# Patient Record
Sex: MALE | Race: BLACK OR AFRICAN AMERICAN | NOT HISPANIC OR LATINO
[De-identification: names, ages, dates, MRNs, and addresses within clinical notes are randomized per-mention and may not be internally consistent; named-entity substitution may affect disease eponyms.]

---

## 2018-03-13 ENCOUNTER — TRANSCRIPTION ENCOUNTER (OUTPATIENT)
Age: 53
End: 2018-03-13

## 2018-03-14 PROBLEM — Z00.00 ENCOUNTER FOR PREVENTIVE HEALTH EXAMINATION: Status: ACTIVE | Noted: 2018-03-14

## 2019-10-21 ENCOUNTER — APPOINTMENT (OUTPATIENT)
Dept: ORTHOPEDIC SURGERY | Facility: CLINIC | Age: 54
End: 2019-10-21

## 2020-01-06 ENCOUNTER — INPATIENT (INPATIENT)
Facility: HOSPITAL | Age: 55
LOS: 0 days | Discharge: HOME | DRG: 312 | End: 2020-01-07
Attending: INTERNAL MEDICINE | Admitting: INTERNAL MEDICINE
Payer: COMMERCIAL

## 2020-01-06 ENCOUNTER — OUTPATIENT (OUTPATIENT)
Dept: OUTPATIENT SERVICES | Facility: HOSPITAL | Age: 55
LOS: 1 days | End: 2020-01-06
Payer: COMMERCIAL

## 2020-01-06 VITALS
DIASTOLIC BLOOD PRESSURE: 81 MMHG | HEART RATE: 58 BPM | SYSTOLIC BLOOD PRESSURE: 126 MMHG | RESPIRATION RATE: 17 BRPM | OXYGEN SATURATION: 98 % | TEMPERATURE: 98 F | HEIGHT: 71 IN | WEIGHT: 205.91 LBS

## 2020-01-06 DIAGNOSIS — G04.89 OTHER MYELITIS: ICD-10-CM

## 2020-01-06 DIAGNOSIS — R55 SYNCOPE AND COLLAPSE: ICD-10-CM

## 2020-01-06 LAB
ALBUMIN SERPL ELPH-MCNC: 3.9 G/DL — SIGNIFICANT CHANGE UP (ref 3.3–5)
ALP SERPL-CCNC: 21 U/L — LOW (ref 40–120)
ALT FLD-CCNC: 17 U/L — SIGNIFICANT CHANGE UP (ref 10–45)
ANION GAP SERPL CALC-SCNC: 11 MMOL/L — SIGNIFICANT CHANGE UP (ref 5–17)
APTT BLD: 24.5 SEC — LOW (ref 27.5–36.3)
AST SERPL-CCNC: 6 U/L — LOW (ref 10–40)
BASOPHILS # BLD AUTO: 0.02 K/UL — SIGNIFICANT CHANGE UP (ref 0–0.2)
BASOPHILS NFR BLD AUTO: 0.2 % — SIGNIFICANT CHANGE UP (ref 0–2)
BILIRUB SERPL-MCNC: 1.2 MG/DL — SIGNIFICANT CHANGE UP (ref 0.2–1.2)
BUN SERPL-MCNC: 16 MG/DL — SIGNIFICANT CHANGE UP (ref 7–23)
CALCIUM SERPL-MCNC: 8.5 MG/DL — SIGNIFICANT CHANGE UP (ref 8.4–10.5)
CHLORIDE SERPL-SCNC: 106 MMOL/L — SIGNIFICANT CHANGE UP (ref 96–108)
CO2 SERPL-SCNC: 22 MMOL/L — SIGNIFICANT CHANGE UP (ref 22–31)
CREAT SERPL-MCNC: 1.11 MG/DL — SIGNIFICANT CHANGE UP (ref 0.5–1.3)
EOSINOPHIL # BLD AUTO: 0.1 K/UL — SIGNIFICANT CHANGE UP (ref 0–0.5)
EOSINOPHIL NFR BLD AUTO: 1.2 % — SIGNIFICANT CHANGE UP (ref 0–6)
GLUCOSE SERPL-MCNC: 149 MG/DL — HIGH (ref 70–99)
HCT VFR BLD CALC: 45.2 % — SIGNIFICANT CHANGE UP (ref 39–50)
HGB BLD-MCNC: 15.6 G/DL — SIGNIFICANT CHANGE UP (ref 13–17)
IMM GRANULOCYTES NFR BLD AUTO: 0.3 % — SIGNIFICANT CHANGE UP (ref 0–1.5)
INR BLD: 1.41 RATIO — HIGH (ref 0.88–1.16)
LYMPHOCYTES # BLD AUTO: 4.01 K/UL — HIGH (ref 1–3.3)
LYMPHOCYTES # BLD AUTO: 46.3 % — HIGH (ref 13–44)
MAGNESIUM SERPL-MCNC: 2 MG/DL — SIGNIFICANT CHANGE UP (ref 1.6–2.6)
MCHC RBC-ENTMCNC: 29.2 PG — SIGNIFICANT CHANGE UP (ref 27–34)
MCHC RBC-ENTMCNC: 34.5 GM/DL — SIGNIFICANT CHANGE UP (ref 32–36)
MCV RBC AUTO: 84.6 FL — SIGNIFICANT CHANGE UP (ref 80–100)
MONOCYTES # BLD AUTO: 0.6 K/UL — SIGNIFICANT CHANGE UP (ref 0–0.9)
MONOCYTES NFR BLD AUTO: 6.9 % — SIGNIFICANT CHANGE UP (ref 2–14)
NEUTROPHILS # BLD AUTO: 3.91 K/UL — SIGNIFICANT CHANGE UP (ref 1.8–7.4)
NEUTROPHILS NFR BLD AUTO: 45.1 % — SIGNIFICANT CHANGE UP (ref 43–77)
NRBC # BLD: 0 /100 WBCS — SIGNIFICANT CHANGE UP (ref 0–0)
PHOSPHATE SERPL-MCNC: 3.2 MG/DL — SIGNIFICANT CHANGE UP (ref 2.5–4.5)
PLATELET # BLD AUTO: 126 K/UL — LOW (ref 150–400)
POTASSIUM SERPL-MCNC: 3.4 MMOL/L — LOW (ref 3.5–5.3)
POTASSIUM SERPL-SCNC: 3.4 MMOL/L — LOW (ref 3.5–5.3)
PROT SERPL-MCNC: 5.2 G/DL — LOW (ref 6–8.3)
PROTHROM AB SERPL-ACNC: 16.4 SEC — HIGH (ref 10–12.9)
RBC # BLD: 5.34 M/UL — SIGNIFICANT CHANGE UP (ref 4.2–5.8)
RBC # FLD: 13.9 % — SIGNIFICANT CHANGE UP (ref 10.3–14.5)
SODIUM SERPL-SCNC: 139 MMOL/L — SIGNIFICANT CHANGE UP (ref 135–145)
TROPONIN T, HIGH SENSITIVITY RESULT: 17 NG/L — SIGNIFICANT CHANGE UP (ref 0–51)
WBC # BLD: 8.67 K/UL — SIGNIFICANT CHANGE UP (ref 3.8–10.5)
WBC # FLD AUTO: 8.67 K/UL — SIGNIFICANT CHANGE UP (ref 3.8–10.5)

## 2020-01-06 PROCEDURE — 85730 THROMBOPLASTIN TIME PARTIAL: CPT

## 2020-01-06 PROCEDURE — 76937 US GUIDE VASCULAR ACCESS: CPT

## 2020-01-06 PROCEDURE — 71046 X-RAY EXAM CHEST 2 VIEWS: CPT | Mod: 26

## 2020-01-06 PROCEDURE — 76937 US GUIDE VASCULAR ACCESS: CPT | Mod: 26

## 2020-01-06 PROCEDURE — C1752: CPT

## 2020-01-06 PROCEDURE — 36514 APHERESIS PLASMA: CPT

## 2020-01-06 PROCEDURE — 36556 INSERT NON-TUNNEL CV CATH: CPT

## 2020-01-06 PROCEDURE — 99222 1ST HOSP IP/OBS MODERATE 55: CPT | Mod: 25

## 2020-01-06 PROCEDURE — C1769: CPT

## 2020-01-06 PROCEDURE — 93291 INTERROG DEV EVAL SCRMS IP: CPT | Mod: 26

## 2020-01-06 PROCEDURE — 93010 ELECTROCARDIOGRAM REPORT: CPT | Mod: NC

## 2020-01-06 PROCEDURE — 85384 FIBRINOGEN ACTIVITY: CPT

## 2020-01-06 PROCEDURE — 85027 COMPLETE CBC AUTOMATED: CPT

## 2020-01-06 PROCEDURE — 77001 FLUOROGUIDE FOR VEIN DEVICE: CPT | Mod: 26

## 2020-01-06 PROCEDURE — 77001 FLUOROGUIDE FOR VEIN DEVICE: CPT

## 2020-01-06 PROCEDURE — 82330 ASSAY OF CALCIUM: CPT

## 2020-01-06 PROCEDURE — 85610 PROTHROMBIN TIME: CPT

## 2020-01-06 PROCEDURE — 82962 GLUCOSE BLOOD TEST: CPT

## 2020-01-06 PROCEDURE — 99285 EMERGENCY DEPT VISIT HI MDM: CPT

## 2020-01-06 RX ORDER — INFLUENZA VIRUS VACCINE 15; 15; 15; 15 UG/.5ML; UG/.5ML; UG/.5ML; UG/.5ML
0.5 SUSPENSION INTRAMUSCULAR ONCE
Refills: 0 | Status: DISCONTINUED | OUTPATIENT
Start: 2020-01-06 | End: 2020-01-07

## 2020-01-06 RX ORDER — DOCUSATE SODIUM 100 MG
1 CAPSULE ORAL
Qty: 0 | Refills: 0 | DISCHARGE

## 2020-01-06 RX ORDER — GABAPENTIN 400 MG/1
1 CAPSULE ORAL
Qty: 0 | Refills: 0 | DISCHARGE

## 2020-01-06 RX ORDER — GABAPENTIN 400 MG/1
100 CAPSULE ORAL THREE TIMES A DAY
Refills: 0 | Status: DISCONTINUED | OUTPATIENT
Start: 2020-01-06 | End: 2020-01-07

## 2020-01-06 RX ORDER — SODIUM CHLORIDE 9 MG/ML
1000 INJECTION INTRAMUSCULAR; INTRAVENOUS; SUBCUTANEOUS
Refills: 0 | Status: DISCONTINUED | OUTPATIENT
Start: 2020-01-06 | End: 2020-01-07

## 2020-01-06 RX ORDER — CHOLECALCIFEROL (VITAMIN D3) 125 MCG
1 CAPSULE ORAL
Qty: 0 | Refills: 0 | DISCHARGE

## 2020-01-06 RX ORDER — POTASSIUM CHLORIDE 20 MEQ
20 PACKET (EA) ORAL ONCE
Refills: 0 | Status: COMPLETED | OUTPATIENT
Start: 2020-01-06 | End: 2020-01-06

## 2020-01-06 RX ORDER — ATENOLOL 25 MG/1
1 TABLET ORAL
Qty: 0 | Refills: 0 | DISCHARGE

## 2020-01-06 RX ORDER — ACETAMINOPHEN 500 MG
2 TABLET ORAL
Qty: 0 | Refills: 0 | DISCHARGE

## 2020-01-06 RX ORDER — ATENOLOL 25 MG/1
25 TABLET ORAL DAILY
Refills: 0 | Status: DISCONTINUED | OUTPATIENT
Start: 2020-01-06 | End: 2020-01-07

## 2020-01-06 RX ORDER — DIPHENHYDRAMINE HCL 50 MG
25 CAPSULE ORAL ONCE
Refills: 0 | Status: DISCONTINUED | OUTPATIENT
Start: 2020-01-06 | End: 2020-01-06

## 2020-01-06 RX ORDER — L.ACIDOPH/B.ANIMALIS/B.LONGUM 15B CELL
1 CAPSULE ORAL
Qty: 0 | Refills: 0 | DISCHARGE

## 2020-01-06 RX ADMIN — Medication 20 MILLIEQUIVALENT(S): at 19:13

## 2020-01-06 NOTE — ED PROVIDER NOTE - ATTENDING CONTRIBUTION TO CARE
54M, pmh htn, denies dx'd with transverse myelitis 10/19 (previously on steroids, gabapentin), with R subclavian central line placed by IR earlier today, presents s/p syncopal episode after first plasmapheresis session. Pt in apheresis unit, had full plasmapheresis session. Afterwards, pt had waxing and waning bp, was given IVF, PO fluids, food as had not yet eaten and was being observed. Pt was sitting upright, felt lightheaded, and lost consciousness for <1 min. Witnessed by wife, no shaking, no incontinence, no -post-ictal period. No tongue biting. Now feeling well, denies any cp, sob, abd pain. No recent illnesses, f/c, n/v/d, or other complaints. Prior to losing consciousness, deneis any other sx including cp, sob, palpitation, or other complaints.    PE: NAD, NCAT, MMM, Trachea midline, Normal conjunctiva, lungs CTAB, S1/S2 RRR, Normal perfusion, 2+ radial pulses bilat, Abdomen Soft, NTND, No rebound/guarding, No LE edema, No deformity of extremities, No rashes,  No focal motor or sensory deficits. CN II-XII intact. Visual fields intact. EOMI, PERRLA. Facial sensation equal bilat. Smile and eye closure equal bilat. Hearing intact bilat. Palate elevation equal, tongue protrusion midline. Lateral head rotation equal bilat. 5/5 strength UE and LE bilat. Decreased sensation bilat UE (unchanged since dx transverse myelitis).    Pt very well appearing. EKG with J point elevation precordial leads, though appropriate intervals. Obtain troponin, though very low suspicion acs. Check CBC eval for anemia, cmp eval for metabolic derangement. Monitor. Re-eval. - Rolando Gaming MD

## 2020-01-06 NOTE — H&P ADULT - NSICDXPASTMEDICALHX_GEN_ALL_CORE_FT
PAST MEDICAL HISTORY:  HOCM (hypertrophic obstructive cardiomyopathy)     HTN (hypertension)     Transverse myelitis

## 2020-01-06 NOTE — ED PROVIDER NOTE - NS ED ROS FT
ROS:  GENERAL: No fever, no chills  EYES: no change in vision  HEENT: no trouble swallowing, no trouble speaking  CARDIAC: no chest pain  PULMONARY: no cough, no dyspnea  GI: no abdominal pain, no nausea, no vomiting, no diarrhea, no constipation  : No dysuria, hematuria or frequency  SKIN: no rashes  NEURO: syncope, no headache, no weakness  MSK: No joint pain

## 2020-01-06 NOTE — ED PROVIDER NOTE - PHYSICAL EXAMINATION
Physical Exam:  Gen: NAD, AOx3, non-toxic appearing, able to ambulate without assistance  Head: NCAT  HEENT: EOMI, PEERLA, normal conjunctiva, tongue midline, oral mucosa moist  Lung: CTAB, no respiratory distress, no wheezes/rhonchi/rales B/L, speaking in full sentences  CV: RRR, no murmurs, rubs or gallops, distal pulses 2+ b/l  Abd: soft, NT, ND, no guarding, no rigidity, no rebound tenderness, no CVA tenderness   MSK: no visible deformities, ROM normal in UE/LE, no back TTP  Neuro: 5/5 strength RUE and RLE, 4/5 strength LUE and LLE (chronic), decreased sensation UE's b/l (chronic), normal FTN, CN II-XII intact  Skin: Warm, well perfused, no rash, no leg swelling  Psych: normal affect, calm

## 2020-01-06 NOTE — ED PROVIDER NOTE - OBJECTIVE STATEMENT
55yo male HTN, HOCM w/ loop recorder and transverse myelitis p/w syncope after first apheresis session today. Spoke with Herbie RODRIGUES from apheresis and said patient was normotensive and feeling episodically lightheaded when he ultimately syncopized while sitting. Did not witness seizure-like activity, tongue biting, incontinence or head trauma. Patient remembers feeling lightheaded then woke up in the stretcher on the way to the ED. Denies chest pain, dyspnea, new numbness/weakness, change in vision, headache.

## 2020-01-06 NOTE — ED PROVIDER NOTE - PROGRESS NOTE DETAILS
Spoke to Dr. Garza from transfusions medicine. Received total 700 ml NS. She states that pt does have HOCM and has a loop recorder (pt did not mention). Again spoke to patient, he states that does have HOCM and loop monitor. His cardiologist is Dr. Milton [facility]. Will have loop recorder interrogated, contact pt's cardiologist. - Rolando Gaming MD Jayy PGY1: placed ED consult order to patient's cardiologist. Patient to have loop recorder interrogated. Jayy PGY1: rep put out third call to patient's cardiologist, still awaiting callback. Patient finished apheresis after 2pm and per interrogation had episode of Wenkebach's at 1pm. Trying to reach patient's cardiologist for further recs. If no answer will admit

## 2020-01-06 NOTE — ED PROVIDER NOTE - CLINICAL SUMMARY MEDICAL DECISION MAKING FREE TEXT BOX
53yo male p/w syncope w/ prodrome after apheresis. Likely situational syncope, but w/ cardiac disease will need to have loop recorder interrogated. Labs, trop, CXR. Will contact patient's cardiologist and reassess.

## 2020-01-06 NOTE — ED ADULT NURSE REASSESSMENT NOTE - NS ED NURSE REASSESS COMMENT FT1
Pt is breathing unlabored on RA. VSS. Educated pt on plan of care. Safety and comfort maintained. Awaiting admission. Family at bedside.

## 2020-01-06 NOTE — CONSULT NOTE ADULT - PROBLEM SELECTOR RECOMMENDATION 9
Suspect secondary to hypovolemia and underlying HOCM   Monitor on Tele   Check thyroid panel   IV hydration   check orthostatics

## 2020-01-06 NOTE — CONSULT NOTE ADULT - SUBJECTIVE AND OBJECTIVE BOX
CHIEF COMPLAINT:  Syncope       HISTORY OF PRESENT ILLNESS:  55yo male HTN, HOCM w/ loop recorder and transverse myelitis p/w syncope after first apheresis session today. Spoke with Herbie RODRIGUES from apheresis and said patient was normotensive and feeling episodically lightheaded when he ultimately syncopized while sitting. Did not witness seizure-like activity, tongue biting, incontinence or head trauma. Patient remembers feeling lightheaded then woke up in the stretcher on the way to the ED. Denies chest pain, dyspnea, new numbness/weakness, change in vision, headache.    PAST MEDICAL & SURGICAL HISTORY:  HOCM (hypertrophic obstructive cardiomyopathy)  HTN (hypertension)  Transverse myelitis  No significant past surgical history          MEDICATIONS:                  FAMILY HISTORY:      SOCIAL HISTORY:    [ ] Non-smoker  [ ] Smoker  [ ] Alcohol    Allergies    No Known Allergies    Intolerances    	    REVIEW OF SYSTEMS:  CONSTITUTIONAL: No fever, weight loss, or fatigue  EYES: No eye pain, visual disturbances, or discharge  ENMT:  No difficulty hearing, tinnitus, vertigo; No sinus or throat pain  NECK: No pain or stiffness  RESPIRATORY: No cough, wheezing, chills or hemoptysis; No Shortness of Breath  CARDIOVASCULAR: No chest pain, palpitations, passing out, dizziness, or leg swelling  GASTROINTESTINAL: No abdominal or epigastric pain. No nausea, vomiting, or hematemesis; No diarrhea or constipation. No melena or hematochezia.  GENITOURINARY: No dysuria, frequency, hematuria, or incontinence  NEUROLOGICAL: No headaches, memory loss, loss of strength, numbness, or tremors  SKIN: No itching, burning, rashes, or lesions   LYMPH Nodes: No enlarged glands  ENDOCRINE: No heat or cold intolerance; No hair loss  MUSCULOSKELETAL: No joint pain or swelling; No muscle, back, or extremity pain  PSYCHIATRIC: No depression, anxiety, mood swings, or difficulty sleeping  HEME/LYMPH: No easy bruising, or bleeding gums  ALLERY AND IMMUNOLOGIC: No hives or eczema	    [ ] All others negative	  [ ] Unable to obtain    PHYSICAL EXAM:  T(C): 36.7 (01-06-20 @ 19:12), Max: 36.8 (01-06-20 @ 15:06)  HR: 57 (01-06-20 @ 19:12) (57 - 58)  BP: 147/90 (01-06-20 @ 19:12) (121/85 - 147/90)  RR: 20 (01-06-20 @ 19:12) (16 - 20)  SpO2: 96% (01-06-20 @ 19:12) (96% - 98%)  Wt(kg): --  I&O's Summary      Appearance: NAD	  HEENT:   Dry oral mucosa, PERRL, EOMI	  Lymphatic: No lymphadenopathy  Cardiovascular: Normal S1 S2, No JVD, No murmurs, No edema  Respiratory: Lungs clear to auscultation	  Psychiatry: A & O x 3, Mood & affect appropriate  Gastrointestinal:  Soft, Non-tender, + BS	  Skin: No rashes, No ecchymoses, No cyanosis	  Neurologic: Non-focal  Extremities: Normal range of motion, No clubbing, cyanosis or edema  Vascular: Peripheral pulses palpable 2+ bilaterally    TELEMETRY: 	    ECG:  	    PROCEDURE:   · Procedure Name	ILR Interrogation	  · Procedure Performed By	Myself	  · 	[employer]	  · Model	Reveal LINR LNR11	  · Battery	Good	  · Underlying Rhythm	Sinus av and sinus rhythm at 50-60's	  		  · Additional Procedure Details	1) Indication for loop recorder interrogation: s/p syncopal episode right after Apheresis session shortly after 2pm today 2) Presenting rhythm: Sinus av and sinus rhythm at 50-60's  3) Stored data revealed 5 av-triggered episodes today (1/6/19) between 1:04pm-1:07pm with average ventricular rate between 39-42 bpm, review of EGMs c/w sinus bradycardia with intermittent Wenckebach/high vagal tone with P-P prolongation, these events occurred during apheresis session. As per the patient and his wife, during apheresis session he was noted to have "slow heart rate" and was given IV fluids. No events recorded after 1:07pm and the syncopal episode occurred after 2pm.    4) AT/AF burden: 0% 5) Changes made: none 6) Strips revealed with EP attending (Dr. Olmstead) 6) Discussed with ED team   [id number]	  		      Electronic Signatures:  Irma Cordoba (YONNY)  (Signed 06-Jan-2020 19:19)  	Authored: PROCEDURE      Last Updated: 06-Jan-2020 19:19 by Irma Cordoba (NP)  RADIOLOGY:  < from: Xray Chest 2 Views PA/Lat (01.06.20 @ 18:49) >    ******PRELIMINARY REPORT******    ******PRELIMINARY REPORT******          EXAM:  XR CHEST PA LAT 2V                            PROCEDURE DATE:  01/06/2020      ******PRELIMINARY REPORT******    ******PRELIMINARY REPORT******              INTERPRETATION:  no emergent findings.    follow up official report in AM              ******PRELIMINARY REPORT******    ******PRELIMINARY REPORT******          VIRI MCDANIEL M.D., RADIOLOGY RESIDENT                      < end of copied text >    OTHER: 	  	  LABS:	 	    CARDIAC MARKERS:                                  15.6   8.67  )-----------( 126      ( 06 Jan 2020 16:10 )             45.2     01-06    139  |  106  |  16  ----------------------------<  149<H>  3.4<L>   |  22  |  1.11    Ca    8.5      06 Jan 2020 16:10  Phos  3.2     01-06  Mg     2.0     01-06    TPro  5.2<L>  /  Alb  3.9  /  TBili  1.2  /  DBili  x   /  AST  6<L>  /  ALT  17  /  AlkPhos  21<L>  01-06    proBNP:   Lipid Profile:   HgA1c:   TSH: CHIEF COMPLAINT:  Syncope       HISTORY OF PRESENT ILLNESS:  55yo male HTN, HOCM w/ loop recorder and transverse myelitis p/w syncope after first apheresis session today. Spoke with Herbie ORDRIGUES from apheresis and said patient was normotensive and feeling episodically lightheaded when he ultimately syncopized while sitting. Did not witness seizure-like activity, tongue biting, incontinence or head trauma. Patient remembers feeling lightheaded then woke up in the stretcher on the way to the ED. Denies chest pain, dyspnea, new numbness/weakness, change in vision, headache.    PAST MEDICAL & SURGICAL HISTORY:  HOCM (hypertrophic obstructive cardiomyopathy)  HTN (hypertension)  Transverse myelitis  No significant past surgical history          MEDICATIONS:                  FAMILY HISTORY:      SOCIAL HISTORY:    [ ] Non-smoker  [ ] Smoker  [ ] Alcohol    Allergies    No Known Allergies    Intolerances    	    REVIEW OF SYSTEMS:  CONSTITUTIONAL: No fever, weight loss, or fatigue  EYES: No eye pain, visual disturbances, or discharge  ENMT:  No difficulty hearing, tinnitus, vertigo; No sinus or throat pain  NECK: No pain or stiffness  RESPIRATORY: No cough, wheezing, chills or hemoptysis; No Shortness of Breath  CARDIOVASCULAR: No chest pain, palpitations, passing out, dizziness, or leg swelling  GASTROINTESTINAL: No abdominal or epigastric pain. No nausea, vomiting, or hematemesis; No diarrhea or constipation. No melena or hematochezia.  GENITOURINARY: No dysuria, frequency, hematuria, or incontinence  NEUROLOGICAL: No headaches, memory loss, loss of strength, numbness, or tremors  SKIN: No itching, burning, rashes, or lesions   LYMPH Nodes: No enlarged glands  ENDOCRINE: No heat or cold intolerance; No hair loss  MUSCULOSKELETAL: No joint pain or swelling; No muscle, back, or extremity pain  PSYCHIATRIC: No depression, anxiety, mood swings, or difficulty sleeping  HEME/LYMPH: No easy bruising, or bleeding gums  ALLERY AND IMMUNOLOGIC: No hives or eczema	    [ ] All others negative	  [ ] Unable to obtain    PHYSICAL EXAM:  T(C): 36.7 (01-06-20 @ 19:12), Max: 36.8 (01-06-20 @ 15:06)  HR: 57 (01-06-20 @ 19:12) (57 - 58)  BP: 147/90 (01-06-20 @ 19:12) (121/85 - 147/90)  RR: 20 (01-06-20 @ 19:12) (16 - 20)  SpO2: 96% (01-06-20 @ 19:12) (96% - 98%)  Wt(kg): --  I&O's Summary      Appearance: NAD	  HEENT:   Dry oral mucosa, PERRL, EOMI	  Lymphatic: No lymphadenopathy r upper chest permcath   Cardiovascular: Normal S1 S2, No JVD, + murmurs, No edema  Respiratory: Lungs clear to auscultation	  Psychiatry: A & O x 3, Mood & affect appropriate  Gastrointestinal:  Soft, Non-tender, + BS	  Skin: No rashes, No ecchymoses, No cyanosis	  Neurologic: Non-focal  Extremities: Normal range of motion, No clubbing, cyanosis or edema  Vascular: Peripheral pulses palpable 2+ bilaterally    TELEMETRY: 	SR    ECG:  	    PROCEDURE:   · Procedure Name	ILR Interrogation	  · Procedure Performed By	Myself	  · 	Black Drumm	  · Model	Reveal LINR LNR11	  · Battery	Good	  · Underlying Rhythm	Sinus av and sinus rhythm at 50-60's	  		  · Additional Procedure Details	1) Indication for loop recorder interrogation: s/p syncopal episode right after Apheresis session shortly after 2pm today 2) Presenting rhythm: Sinus av and sinus rhythm at 50-60's  3) Stored data revealed 5 av-triggered episodes today (1/6/19) between 1:04pm-1:07pm with average ventricular rate between 39-42 bpm, review of EGMs c/w sinus bradycardia with intermittent Wenckebach/high vagal tone with P-P prolongation, these events occurred during apheresis session. As per the patient and his wife, during apheresis session he was noted to have "slow heart rate" and was given IV fluids. No events recorded after 1:07pm and the syncopal episode occurred after 2pm.    4) AT/AF burden: 0% 5) Changes made: none 6) Strips revealed with EP attending (Dr. Olmstead) 6) Discussed with ED team   [id number]	  		      Electronic Signatures:  Irma Cordoba (YONNY)  (Signed 06-Jan-2020 19:19)  	Authored: PROCEDURE      Last Updated: 06-Jan-2020 19:19 by Irma Cordoba (NP)  RADIOLOGY:  < from: Xray Chest 2 Views PA/Lat (01.06.20 @ 18:49) >    ******PRELIMINARY REPORT******    ******PRELIMINARY REPORT******          EXAM:  XR CHEST PA LAT 2V                            PROCEDURE DATE:  01/06/2020      ******PRELIMINARY REPORT******    ******PRELIMINARY REPORT******              INTERPRETATION:  no emergent findings.    follow up official report in AM              ******PRELIMINARY REPORT******    ******PRELIMINARY REPORT******          VIRI MCDANIEL M.D., RADIOLOGY RESIDENT                      < end of copied text >    OTHER: 	  	  LABS:	 	    CARDIAC MARKERS:                                  15.6   8.67  )-----------( 126      ( 06 Jan 2020 16:10 )             45.2     01-06    139  |  106  |  16  ----------------------------<  149<H>  3.4<L>   |  22  |  1.11    Ca    8.5      06 Jan 2020 16:10  Phos  3.2     01-06  Mg     2.0     01-06    TPro  5.2<L>  /  Alb  3.9  /  TBili  1.2  /  DBili  x   /  AST  6<L>  /  ALT  17  /  AlkPhos  21<L>  01-06    proBNP:   Lipid Profile:   HgA1c:   TSH:

## 2020-01-06 NOTE — ED ADULT NURSE NOTE - NSIMPLEMENTINTERV_GEN_ALL_ED
Implemented All Fall Risk Interventions:  [place] to call system. Call bell, personal items and telephone within reach. Instruct patient to call for assistance. Room bathroom lighting operational. Non-slip footwear when patient is off stretcher. Physically safe environment: no spills, clutter or unnecessary equipment. Stretcher in lowest position, wheels locked, appropriate side rails in place. Provide visual cue, wrist band, yellow gown, etc. Monitor gait and stability. Monitor for mental status changes and reorient to person, place, and time. Review medications for side effects contributing to fall risk. Reinforce activity limits and safety measures with patient and family.

## 2020-01-06 NOTE — CONSULT NOTE ADULT - ASSESSMENT
Assessment: 53yo man h/o HTN, HOCM w/ loop recorder, recent diagnosis of transverse myelitis at [facility] s/p 1 session of apharesis, admitted to [facility] for syncope workup after apharesis with Neurology consult for management of transverse myelitis while inpatient. Current neurological examination demonstrates motor and sensory involvement consistent with patient's transverse myelitis and per patient has not worsened. Neuroimaging from outpatient shows improvement of LTEM from working diagnosis of transverse myelitis.    Impression: stable transverse myelitis with completion of steroid taper  cardiogenic syncope likely given patient coming back to self pretty quickly after event   +/- involvement of adrenals in the setting of high dose steroid use, though with patient's noted HOCM and loop interrogation cardiogenic may be more likely    Recommendations:  [ ] primary team to contact Dr. Nguyen regarding management of transverse myelitis  [ ] at this time not to continue steroids - to be readdressed in morning  [ ] no further workup required as inpatient    -Management & disposition discussed with neuro attending, Dr. Hernandez

## 2020-01-06 NOTE — CONSULT NOTE ADULT - SUBJECTIVE AND OBJECTIVE BOX
HPI: 53yo man h/o HTN, HOCM w/ loop recorder, recent diagnosis of transverse myelitis at [facility] s/p 1 session of apharesis, admitted to [facility] for syncope workup after apharesis with Neurology consult for management of transverse myelitis while inpatient.     REVIEW OF SYSTEMS  General:	  Skin/Breast:	  Ophthalmologic:  ENMT:	  Respiratory and Thorax:	  Cardiovascular:	  Gastrointestinal:	  Genitourinary:	  Musculoskeletal:	  Neurological:	  Psychiatric:	  Hematology/Lymphatics:	  Endocrine:	  Allergic/Immunologic:	    A 10-system ROS was performed and is negative except for those items noted above and/or in the HPI.    PAST MEDICAL & SURGICAL HISTORY:  HOCM (hypertrophic obstructive cardiomyopathy)  HTN (hypertension)  Transverse myelitis  No significant past surgical history    FAMILY HISTORY:    SOCIAL HISTORY:   T/E/D:   Occupation:   Lives with:     MEDICATIONS (HOME):  Home Medications:  atenolol 25 mg oral tablet: 1 tab(s) orally once a day (06 Jan 2020 20:42)  Colace 100 mg oral capsule: 1 cap(s) orally , As Needed (06 Jan 2020 20:42)  gabapentin 100 mg oral capsule: 1 cap(s) orally 3 times a day (06 Jan 2020 20:42)  Probiotic Formula oral capsule: 1 cap(s) orally once a day (06 Jan 2020 20:42)  Tylenol Extra Strength 500 mg oral tablet: 2 tab(s) orally , As Needed (06 Jan 2020 20:42)  Vitamin D3 50,000 intl units (1250 mcg) oral capsule: 1 cap(s) orally once a week (Tuesday) (06 Jan 2020 20:42)    MEDICATIONS  (STANDING):    MEDICATIONS  (PRN):    ALLERGIES/INTOLERANCES:  Allergies  No Known Allergies    Intolerances    VITALS & EXAMINATION:  Vital Signs Last 24 Hrs  T(C): 36.7 (06 Jan 2020 21:26), Max: 36.8 (06 Jan 2020 15:06)  T(F): 98.1 (06 Jan 2020 21:26), Max: 98.2 (06 Jan 2020 15:06)  HR: 60 (06 Jan 2020 21:26) (57 - 60)  BP: 129/84 (06 Jan 2020 21:26) (121/85 - 147/90)  BP(mean): --  RR: 18 (06 Jan 2020 21:26) (16 - 20)  SpO2: 97% (06 Jan 2020 21:26) (96% - 98%)    General:  Constitutional: Obese Male, appears stated age, in no apparent distress including pain  Head: Normocephalic & atraumatic.  ENT: Patent ear canals, intact TM, mucus membranes moist & pink, neck supple, no lymphadenopathy.   Respiratory: Patent airway. All lung fields are clear to auscultation bilaterally.  Extremities: No cyanosis, clubbing, or edema.  Skin: No rashes, bruising, or discoloration.    Cardiovascular (>2): RRR no murmurs. Carotid pulsations symmetric, no bruits. Normal capillary beds refill, 1-2 seconds or less.     Neurological (>12):  MS: Awake, alert, oriented to person, place, situation, time. Normal affect. Follows all commands.    Language: Speech is clear, fluent with good repetition & comprehension (able to name objects___)    CNs: PERRLA (R = 3mm, L = 3mm). VFF. EOMI no nystagmus, no diplopia. V1-3 intact to LT/pinprick, well developed masseter muscles b/l. No facial asymmetry b/l, full eye closure strength b/l. Hearing grossly normal (rubbing fingers) b/l. Symmetric palate elevation in midline. Gag reflex deferred. Head turning & shoulder shrug intact b/l. Tongue midline, normal movements, no atrophy.    Fundoscopic: pale w/ sharp discs margins No vascular changes.      Motor: Normal muscle bulk & tone. No noticeable tremor or seizure. No pronator drift.              Deltoid	Biceps	Triceps	Wrist	Finger ABd	   R	5	5	5	5	5		5 	  L	5	5	5	5	5		5    	H-Flex	H-Ext	H-ABd	H-ADd	K-Flex	K-Ext	D-Flex	P-Flex  R	5	5	5	5	5	5	5	5 	   L	5	5	5	5	5	5	5	5	     Sensation: Intact to LT/PP/Temp/Vibration/Position b/l throughout.     Cortical: Extinction on DSS (neglect): none    Reflexes:              Biceps(C5)       BR(C6)     Triceps(C7)               Patellar(L4)    Achilles(S1)    Plantar Resp  R	2	          2	             2		        2		    2		Down   L	2	          2	             2		        2		    2		Down     Coordination: intact rapid-alt movements. No dysmetria to FTN/HTS    Gait: Normal Romberg. No postural instability. Normal stance and tandem gait.     LABORATORY:  CBC                       15.6   8.67  )-----------( 126      ( 06 Jan 2020 16:10 )             45.2     Chem 01-06    139  |  106  |  16  ----------------------------<  149<H>  3.4<L>   |  22  |  1.11    Ca    8.5      06 Jan 2020 16:10  Phos  3.2     01-06  Mg     2.0     01-06    TPro  5.2<L>  /  Alb  3.9  /  TBili  1.2  /  DBili  x   /  AST  6<L>  /  ALT  17  /  AlkPhos  21<L>  01-06    LFTs LIVER FUNCTIONS - ( 06 Jan 2020 16:10 )  Alb: 3.9 g/dL / Pro: 5.2 g/dL / ALK PHOS: 21 U/L / ALT: 17 U/L / AST: 6 U/L / GGT: x           Coagulopathy PT/INR - ( 06 Jan 2020 16:10 )   PT: 16.4 sec;   INR: 1.41 ratio         PTT - ( 06 Jan 2020 16:10 )  PTT:24.5 sec  Lipid Panel   A1c   Cardiac enzymes     U/A   CSF  Immunological  Other    STUDIES & IMAGING:  Studies (EKG, EEG, EMG, etc):     Radiology (XR, CT, MR, U/S, TTE/BASIL): HPI: 53yo man h/o HTN, HOCM w/ loop recorder, recent diagnosis of transverse myelitis at [facility] s/p 1 session of apharesis, admitted to [facility] for syncope workup after apharesis with Neurology consult for management of transverse myelitis while inpatient. Patient was diagnosed in October 2019 with transverse myelitis by his outpatient Neurologist Dr. Jose Nguyen at [facility] from having left leg dragging with progressive neck pain and arms and torso numbness for 3+ months. Had MRI imaging 10/11/19 and 11/23/19 demonstrating abnormal signal changes in C2-T1/2 which showed mild improvemet after steroids. Workup from labwork patient has on his phone showed ACE serum level 36 and ACE CSF 1.4 with IgG synthesis 9 and infectious workup otherwise negative. Consideration of NMO was made given the longitudinally extensive myelitis but aquaporin 4 antibody was negative. He has initially been on steroids with taper from October, but when he was tapered down to about 20mg daily for a week, his neck pain/stiffness with associated numbness in both extremities began to exacerbate and he was put back on higher dose of steroids and today took his last dose of prednisone 10mg daily as part of the 2nd taper. Denies recent illnesses, falls, headaches, vision changes, shortness of breath, chest pain, though when he was at the apharesis center today, he had an episode of syncope where he felt palpitations, felt light headed, remembers jose antonio episode, no associated urinary incontinence, fecal incontinence, tongue biting.     At baseline, patient requires a walker, which has not changed since his diagnosis of transverse myelitis in October. Also requires to strain for defecation.    REVIEW OF SYSTEMS	    A 10-system ROS was performed and is negative except for those items noted above and/or in the HPI.    PAST MEDICAL & SURGICAL HISTORY:  HOCM (hypertrophic obstructive cardiomyopathy)  HTN (hypertension)  Transverse myelitis  No significant past surgical history    MEDICATIONS (HOME):  Home Medications:  atenolol 25 mg oral tablet: 1 tab(s) orally once a day (06 Jan 2020 20:42)  Colace 100 mg oral capsule: 1 cap(s) orally , As Needed (06 Jan 2020 20:42)  gabapentin 100 mg oral capsule: 1 cap(s) orally 3 times a day (06 Jan 2020 20:42)  Probiotic Formula oral capsule: 1 cap(s) orally once a day (06 Jan 2020 20:42)  Tylenol Extra Strength 500 mg oral tablet: 2 tab(s) orally , As Needed (06 Jan 2020 20:42)  Vitamin D3 50,000 intl units (1250 mcg) oral capsule: 1 cap(s) orally once a week (Tuesday) (06 Jan 2020 20:42)    MEDICATIONS  (STANDING):    MEDICATIONS  (PRN):    ALLERGIES/INTOLERANCES:  Allergies  No Known Allergies    VITALS & EXAMINATION:  Vital Signs Last 24 Hrs  T(C): 36.7 (06 Jan 2020 21:26), Max: 36.8 (06 Jan 2020 15:06)  T(F): 98.1 (06 Jan 2020 21:26), Max: 98.2 (06 Jan 2020 15:06)  HR: 60 (06 Jan 2020 21:26) (57 - 60)  BP: 129/84 (06 Jan 2020 21:26) (121/85 - 147/90)  BP(mean): --  RR: 18 (06 Jan 2020 21:26) (16 - 20)  SpO2: 97% (06 Jan 2020 21:26) (96% - 98%)    Neurological (>12):  MS: Awake, alert, oriented to person, place, situation, time. Normal affect. Follows all commands.    Language: Speech is clear, fluent with good repetition & comprehension    CNs: PERRLA (R = 3mm, L = 3mm), no APD, VFF. EOMI no nystagmus, no diplopia. V1-3 intact to LT/pinprick, well developed masseter muscles b/l. No facial asymmetry b/l, full eye closure strength b/l. Hearing grossly normal (rubbing fingers) b/l. Symmetric palate elevation in midline. Gag reflex deferred. Head turning & shoulder shrug intact b/l. Tongue midline, normal movements, no atrophy.      Motor: Normal muscle bulk & tone. No noticeable tremor. No pronator nor downward drift.              Deltoid	Biceps	Triceps	Wrist	Finger ABd	   R	5	5	5	5	5		full 	  L	4+	4+	4+	4+	4+		reduced    	H-Flex	H-Ext	H-ABd	H-ADd	K-Flex	K-Ext	D-Flex	P-Flex  R	4+	4+	4+	4+	4+	4+	4+	4+ 	   L	5	5	5	5	5	5	5	5	     Sensation: Reduced upon left arm and leg (from knee down including the feet) to light touch and pinprick; has reduced sensation throughout the anterior thorax to light touch and pinprick as compared to arms (thorax sensation symmetrically decreased upon left and right sides)     Cortical: Extinction on DSS (neglect): none    Reflexes:              Biceps(C5)       BR(C6)     Triceps(C7)               Patellar(L4)    Achilles(S1)    Plantar Resp  R	2	          2	             2		        2		    2		mute   L	3	          3	             3		        3		    3		mute    2 beats of clonus were elicited upon rapid dorsiflexion of left foot  upon patellar reflex, noted 2-3 beats of clonus, nonsustained     Coordination: noted dysmetria of left arm to FTN testing with dysdiadochokinesia of left hand    Gait: deferred    LABORATORY:  CBC                       15.6   8.67  )-----------( 126      ( 06 Jan 2020 16:10 )             45.2     Chem 01-06    139  |  106  |  16  ----------------------------<  149<H>  3.4<L>   |  22  |  1.11    Ca    8.5      06 Jan 2020 16:10  Phos  3.2     01-06  Mg     2.0     01-06    TPro  5.2<L>  /  Alb  3.9  /  TBili  1.2  /  DBili  x   /  AST  6<L>  /  ALT  17  /  AlkPhos  21<L>  01-06    LFTs LIVER FUNCTIONS - ( 06 Jan 2020 16:10 )  Alb: 3.9 g/dL / Pro: 5.2 g/dL / ALK PHOS: 21 U/L / ALT: 17 U/L / AST: 6 U/L / GGT: x           Coagulopathy PT/INR - ( 06 Jan 2020 16:10 )   PT: 16.4 sec;   INR: 1.41 ratio         PTT - ( 06 Jan 2020 16:10 )  PTT:24.5 sec  Lipid Panel   A1c   Cardiac enzymes     U/A   CSF  Immunological  Other    STUDIES & IMAGING:  Studies (EKG, EEG, EMG, etc):     Radiology (XR, CT, MR, U/S, TTE/BASIL):

## 2020-01-06 NOTE — ED ADULT NURSE NOTE - OBJECTIVE STATEMENT
Pt presents to ED with complaint of syncopal episode, AXOX3, moving all extremities,  no weakness or loss of sensation, pt reports being at plasmaphoresis and having a mediport placed, pt had syncopal episode and LOC, pt reports waking on stretcher, pt denies pain, PERRL, breathing unlabored and symmetrical, lung sounds vesicular, no chest pain normal S1S2, abdomen soft and nontender, no nausea vomiting or diarrhea, no fevers or chills at home. No sick contacts.

## 2020-01-06 NOTE — ED ADULT TRIAGE NOTE - NS ED TRIAGE AVPU SCALE
Home
Alert-The patient is alert, awake and responds to voice. The patient is oriented to time, place, and person. The triage nurse is able to obtain subjective information.

## 2020-01-06 NOTE — H&P ADULT - NSHPPHYSICALEXAM_GEN_ALL_CORE
Vital Signs Last 24 Hrs  T(C): 36.7 (06 Jan 2020 21:26), Max: 36.8 (06 Jan 2020 15:06)  T(F): 98.1 (06 Jan 2020 21:26), Max: 98.2 (06 Jan 2020 15:06)  HR: 60 (06 Jan 2020 21:26) (57 - 60)  BP: 129/84 (06 Jan 2020 21:26) (121/85 - 147/90)  BP(mean): --  RR: 18 (06 Jan 2020 21:26) (16 - 20)  SpO2: 97% (06 Jan 2020 21:26) (96% - 98%)    PHYSICAL EXAM:  GENERAL: NAD, well-developed  HEAD:  Atraumatic, Normocephalic  EYES: EOMI, PERRLA, conjunctiva and sclera clear  NECK: Supple, No JVD  CHEST/LUNG: Clear to auscultation bilaterally; No wheeze  HEART: Regular rate and rhythm; No murmurs, rubs, or gallops  ABDOMEN: Soft, Nontender, Nondistended; Bowel sounds present  EXTREMITIES:  2+ Peripheral Pulses, No clubbing, cyanosis, or edema  PSYCH: AAOx3  NEUROLOGY: left side weakness  SKIN: No rashes or lesions

## 2020-01-06 NOTE — H&P ADULT - HISTORY OF PRESENT ILLNESS
53yo male HTN, HOCM w/ loop recorder and transverse myelitis p/w syncope after first apheresis session today. Spoke with Herbie RODRIGUES from apheresis and said patient was normotensive and feeling episodically lightheaded when he ultimately syncopized while sitting. Did not witness seizure-like activity, tongue biting, incontinence or head trauma. Patient remembers feeling lightheaded then woke up in the stretcher on the way to the ED. Denies chest pain, dyspnea, new numbness/weakness, change in vision, headache.

## 2020-01-06 NOTE — H&P ADULT - ASSESSMENT
53 yo male h/o htn, HOCM s/p loop recorder, transverse myelitis, a/w syncope, post apheresis session      syncope  cards consult  echo  tele  f/u loop recorder interrogation  orthostatics    transverse myelitis  neuro consulted    cont home meds    dvt ppx            Advanced care planning was discussed with patient and family.  Advanced care planning forms were reviewed and discussed.  Differential diagnosis and plan of care discussed with patient after the evaluation.   Pain assessed and judicious use of narcotics when appropriate was discussed.  Importance of Fall prevention discussed.  Counseling on Smoking and Alcohol cessation was offered when appropriate.  Counseling on Diet, exercise, and medication compliance was done.

## 2020-01-06 NOTE — ED ADULT NURSE REASSESSMENT NOTE - NS ED NURSE REASSESS COMMENT FT1
Patient remians stable in the ED, family at bedside, patient admitted to telemetry for syncope awaiting bed availability. No pending stat orders noted. Plan of care explained to pt and verbalized understanding. Will continue to monitor.

## 2020-01-06 NOTE — CONSULT NOTE ADULT - ATTENDING COMMENTS
55yo man h/o HTN, HOCM w/ loop recorder, recent diagnosis of transverse myelitis at [facility] s/p 1 session of aphaeresis, admitted to [facility] for syncope workup after aphaeresis yesterday morning. Pt found to be hypotensive and was given saline boluses. Pt reports not eating the morning before and taking his atenolol before going to aphaeresis. He denies any symptoms now, blood pressure normal.  He does have continued numbness and tingling in his hands and around his waist, and weakness of his left side which is unchanged from before from his dx of transverse myelitis. He follows with a  neurologist at [place] who is working him up for sarcoidosis give Fhx In his mother and brother.   He had a CT chest 10/19 which showed no discrete pulmonary nodule or confluent airspace opacity, shotty lymph nodes measuring up to 2.4 X 1.5cm in right and left paratracheal, subcarinal lymph nodes, hilar, and gastrohepatic ligament.   On exam pt has subjective numbness on the hands, slight weakness on the left arm and leg, hyperreflexic on the left patellar and ankle.     Findings c/w pts known dx of transverse myelitis.   Pt tapered off oral prednisone, no need to continue.   Likely syncopal event 2/2 to hypotension  vs vasovagal, cardiology working up for arrhythmia given hx of HOCM.   Pt can resume apharesis as tolerated, avoid antihypertensives prior to session.

## 2020-01-06 NOTE — PROCEDURE NOTE - ADDITIONAL PROCEDURE DETAILS
1) Indication for loop recorder interrogation: s/p syncopal episode right after Apheresis session shortly after 2pm today  2) Presenting rhythm: Sinus Rhythm at 60's   3) Stored data revealed 5 av-triggered episodes today (1/6/19) between 1:04pm-1:07pm with average ventricular rate between 39-42 bpm, review of EGMs c/w sinus bradycardia with intermittent Wenckebach, these events occurred during apheresis session. No events correlating to the syncopal episode which occurred after 2pm. As per the patient, during apheresis session it was noted to have "slow heart rate" and was given IV fluids.    4) AT/AF burden: 0%  5) Changes made: none  6) Discussed with ED team     [id number] 1) Indication for loop recorder interrogation: s/p syncopal episode right after Apheresis session shortly after 2pm today  2) Presenting rhythm: Sinus av and sinus rhythm at 50-60's   3) Stored data revealed 5 av-triggered episodes today (1/6/19) between 1:04pm-1:07pm with average ventricular rate between 39-42 bpm, review of EGMs c/w sinus bradycardia with intermittent Wenckebach/high vagal tone with P-P prolongation, these events occurred during apheresis session. As per the patient and his wife, during apheresis session he was noted to have "slow heart rate" and was given IV fluids. No events recorded after 1:07pm and the syncopal episode occurred after 2pm.     4) AT/AF burden: 0%  5) Changes made: none  6) Strips revealed with EP attending (Dr. Olmstead)  6) Discussed with ED team     [id number]

## 2020-01-07 ENCOUNTER — TRANSCRIPTION ENCOUNTER (OUTPATIENT)
Age: 55
End: 2020-01-07

## 2020-01-07 VITALS
HEART RATE: 66 BPM | RESPIRATION RATE: 18 BRPM | SYSTOLIC BLOOD PRESSURE: 131 MMHG | OXYGEN SATURATION: 97 % | DIASTOLIC BLOOD PRESSURE: 88 MMHG

## 2020-01-07 DIAGNOSIS — I42.1 OBSTRUCTIVE HYPERTROPHIC CARDIOMYOPATHY: ICD-10-CM

## 2020-01-07 DIAGNOSIS — G37.3 ACUTE TRANSVERSE MYELITIS IN DEMYELINATING DISEASE OF CENTRAL NERVOUS SYSTEM: ICD-10-CM

## 2020-01-07 DIAGNOSIS — R55 SYNCOPE AND COLLAPSE: ICD-10-CM

## 2020-01-07 LAB
ALBUMIN SERPL ELPH-MCNC: 4.4 G/DL — SIGNIFICANT CHANGE UP (ref 3.3–5)
ALP SERPL-CCNC: 33 U/L — LOW (ref 40–120)
ALT FLD-CCNC: 22 U/L — SIGNIFICANT CHANGE UP (ref 10–45)
ANION GAP SERPL CALC-SCNC: 9 MMOL/L — SIGNIFICANT CHANGE UP (ref 5–17)
AST SERPL-CCNC: 21 U/L — SIGNIFICANT CHANGE UP (ref 10–40)
BILIRUB SERPL-MCNC: 1.6 MG/DL — HIGH (ref 0.2–1.2)
BUN SERPL-MCNC: 14 MG/DL — SIGNIFICANT CHANGE UP (ref 7–23)
CALCIUM SERPL-MCNC: 9.5 MG/DL — SIGNIFICANT CHANGE UP (ref 8.4–10.5)
CHLORIDE SERPL-SCNC: 106 MMOL/L — SIGNIFICANT CHANGE UP (ref 96–108)
CO2 SERPL-SCNC: 23 MMOL/L — SIGNIFICANT CHANGE UP (ref 22–31)
CREAT SERPL-MCNC: 1.02 MG/DL — SIGNIFICANT CHANGE UP (ref 0.5–1.3)
GLUCOSE SERPL-MCNC: 106 MG/DL — HIGH (ref 70–99)
HCT VFR BLD CALC: 48.7 % — SIGNIFICANT CHANGE UP (ref 39–50)
HGB BLD-MCNC: 16.7 G/DL — SIGNIFICANT CHANGE UP (ref 13–17)
MAGNESIUM SERPL-MCNC: 2 MG/DL — SIGNIFICANT CHANGE UP (ref 1.6–2.6)
MCHC RBC-ENTMCNC: 28.9 PG — SIGNIFICANT CHANGE UP (ref 27–34)
MCHC RBC-ENTMCNC: 34.3 GM/DL — SIGNIFICANT CHANGE UP (ref 32–36)
MCV RBC AUTO: 84.4 FL — SIGNIFICANT CHANGE UP (ref 80–100)
NRBC # BLD: 0 /100 WBCS — SIGNIFICANT CHANGE UP (ref 0–0)
PHOSPHATE SERPL-MCNC: 3.2 MG/DL — SIGNIFICANT CHANGE UP (ref 2.5–4.5)
PLATELET # BLD AUTO: 152 K/UL — SIGNIFICANT CHANGE UP (ref 150–400)
POTASSIUM SERPL-MCNC: 4.5 MMOL/L — SIGNIFICANT CHANGE UP (ref 3.5–5.3)
POTASSIUM SERPL-SCNC: 4.5 MMOL/L — SIGNIFICANT CHANGE UP (ref 3.5–5.3)
PROT SERPL-MCNC: 6.4 G/DL — SIGNIFICANT CHANGE UP (ref 6–8.3)
RBC # BLD: 5.77 M/UL — SIGNIFICANT CHANGE UP (ref 4.2–5.8)
RBC # FLD: 14 % — SIGNIFICANT CHANGE UP (ref 10.3–14.5)
SODIUM SERPL-SCNC: 138 MMOL/L — SIGNIFICANT CHANGE UP (ref 135–145)
WBC # BLD: 7.77 K/UL — SIGNIFICANT CHANGE UP (ref 3.8–10.5)
WBC # FLD AUTO: 7.77 K/UL — SIGNIFICANT CHANGE UP (ref 3.8–10.5)

## 2020-01-07 PROCEDURE — 85730 THROMBOPLASTIN TIME PARTIAL: CPT

## 2020-01-07 PROCEDURE — 71046 X-RAY EXAM CHEST 2 VIEWS: CPT

## 2020-01-07 PROCEDURE — 99285 EMERGENCY DEPT VISIT HI MDM: CPT | Mod: 25

## 2020-01-07 PROCEDURE — 93306 TTE W/DOPPLER COMPLETE: CPT

## 2020-01-07 PROCEDURE — 93005 ELECTROCARDIOGRAM TRACING: CPT

## 2020-01-07 PROCEDURE — 99238 HOSP IP/OBS DSCHRG MGMT 30/<: CPT

## 2020-01-07 PROCEDURE — 80053 COMPREHEN METABOLIC PANEL: CPT

## 2020-01-07 PROCEDURE — 93306 TTE W/DOPPLER COMPLETE: CPT | Mod: 26

## 2020-01-07 PROCEDURE — 85027 COMPLETE CBC AUTOMATED: CPT

## 2020-01-07 PROCEDURE — 85610 PROTHROMBIN TIME: CPT

## 2020-01-07 PROCEDURE — 99222 1ST HOSP IP/OBS MODERATE 55: CPT | Mod: GC

## 2020-01-07 PROCEDURE — 84100 ASSAY OF PHOSPHORUS: CPT

## 2020-01-07 PROCEDURE — 83735 ASSAY OF MAGNESIUM: CPT

## 2020-01-07 PROCEDURE — P9041: CPT

## 2020-01-07 PROCEDURE — 84484 ASSAY OF TROPONIN QUANT: CPT

## 2020-01-07 RX ORDER — SODIUM CHLORIDE 9 MG/ML
1000 INJECTION INTRAMUSCULAR; INTRAVENOUS; SUBCUTANEOUS
Refills: 0 | Status: DISCONTINUED | OUTPATIENT
Start: 2020-01-07 | End: 2020-01-07

## 2020-01-07 RX ADMIN — GABAPENTIN 100 MILLIGRAM(S): 400 CAPSULE ORAL at 14:10

## 2020-01-07 RX ADMIN — SODIUM CHLORIDE 100 MILLILITER(S): 9 INJECTION INTRAMUSCULAR; INTRAVENOUS; SUBCUTANEOUS at 11:17

## 2020-01-07 RX ADMIN — SODIUM CHLORIDE 75 MILLILITER(S): 9 INJECTION INTRAMUSCULAR; INTRAVENOUS; SUBCUTANEOUS at 01:21

## 2020-01-07 RX ADMIN — ATENOLOL 25 MILLIGRAM(S): 25 TABLET ORAL at 05:32

## 2020-01-07 RX ADMIN — GABAPENTIN 100 MILLIGRAM(S): 400 CAPSULE ORAL at 05:32

## 2020-01-07 NOTE — PROGRESS NOTE ADULT - SUBJECTIVE AND OBJECTIVE BOX
Subjective: Patient seen and examined. No new events except as noted.     REVIEW OF SYSTEMS:    CONSTITUTIONAL: + weakness, fevers or chills  EYES/ENT: No visual changes;  No vertigo or throat pain   NECK: No pain or stiffness  RESPIRATORY: No cough, wheezing, hemoptysis; No shortness of breath  CARDIOVASCULAR: No chest pain or palpitations  GASTROINTESTINAL: No abdominal or epigastric pain. No nausea, vomiting, or hematemesis; No diarrhea or constipation. No melena or hematochezia.  GENITOURINARY: No dysuria, frequency or hematuria  NEUROLOGICAL: No numbness or weakness  SKIN: No itching, burning, rashes, or lesions   All other review of systems is negative unless indicated above.    MEDICATIONS:  MEDICATIONS  (STANDING):  ATENolol  Tablet 25 milliGRAM(s) Oral daily  gabapentin 100 milliGRAM(s) Oral three times a day  influenza   Vaccine 0.5 milliLiter(s) IntraMuscular once  sodium chloride 0.9%. 1000 milliLiter(s) (75 mL/Hr) IV Continuous <Continuous>      PHYSICAL EXAM:  T(C): 36.4 (01-07-20 @ 03:34), Max: 36.8 (01-06-20 @ 15:06)  HR: 65 (01-07-20 @ 03:34) (57 - 65)  BP: 135/88 (01-07-20 @ 03:34) (121/85 - 147/90)  RR: 18 (01-07-20 @ 03:34) (16 - 20)  SpO2: 100% (01-07-20 @ 03:34) (96% - 100%)  Wt(kg): --  I&O's Summary    06 Jan 2020 07:01  -  07 Jan 2020 07:00  --------------------------------------------------------  IN: 240 mL / OUT: 0 mL / NET: 240 mL      Height (cm): 180.34 (01-06 @ 15:06)  Weight (kg): 93.4 (01-06 @ 15:06)  BMI (kg/m2): 28.7 (01-06 @ 15:06)  BSA (m2): 2.14 (01-06 @ 15:06)    Appearance: Normal	  HEENT:   Normal oral mucosa, PERRL, EOMI	  Lymphatic: No lymphadenopathy , no edema  Cardiovascular: Normal S1 S2, No JVD, No murmurs , Peripheral pulses palpable 2+ bilaterally  Respiratory: Lungs clear to auscultation, normal effort 	  Gastrointestinal:  Soft, Non-tender, + BS	  Skin: No rashes, No ecchymoses, No cyanosis, warm to touch  Musculoskeletal: Normal range of motion, normal strength  Psychiatry:  Mood & affect appropriate  Ext: No edema      LABS:    CARDIAC MARKERS:                                15.6   8.67  )-----------( 126      ( 06 Jan 2020 16:10 )             45.2     01-06    139  |  106  |  16  ----------------------------<  149<H>  3.4<L>   |  22  |  1.11    Ca    8.5      06 Jan 2020 16:10  Phos  3.2     01-06  Mg     2.0     01-06    TPro  5.2<L>  /  Alb  3.9  /  TBili  1.2  /  DBili  x   /  AST  6<L>  /  ALT  17  /  AlkPhos  21<L>  01-06    proBNP:   Lipid Profile:   HgA1c:   TSH:             TELEMETRY: SR	    ECG:  	  RADIOLOGY:   < from: Xray Chest 2 Views PA/Lat (01.06.20 @ 18:49) >    ******PRELIMINARY REPORT******    ******PRELIMINARY REPORT******          EXAM:  XR CHEST PA LAT 2V                            PROCEDURE DATE:  01/06/2020      ******PRELIMINARY REPORT******    ******PRELIMINARY REPORT******              INTERPRETATION:  no emergent findings.    follow up official report in AM              ******PRELIMINARY REPORT******    ******PRELIMINARY REPORT******          VIRI MCDANIEL M.D., RADIOLOGY RESIDENT                      < end of copied text >    DIAGNOSTIC TESTING:  [ ] Echocardiogram:  [ ]  Catheterization:  [ ] Stress Test:    OTHER:

## 2020-01-07 NOTE — PROGRESS NOTE ADULT - SUBJECTIVE AND OBJECTIVE BOX
FARHAN GARCÍA  54y Male  MRN:[id number]    Patient is a 54y old  Male who presents with a chief complaint of syncope (07 Jan 2020 09:22)    HPI:  55yo male HTN, HOCM w/ loop recorder and transverse myelitis p/w syncope after first apheresis session today. Spoke with Herbie RODRIGUES from apheresis and said patient was normotensive and feeling episodically lightheaded when he ultimately syncopized while sitting. Did not witness seizure-like activity, tongue biting, incontinence or head trauma. Patient remembers feeling lightheaded then woke up in the stretcher on the way to the ED. Denies chest pain, dyspnea, new numbness/weakness, change in vision, headache. (06 Jan 2020 22:40)      Patient seen and evaluated at bedside. No acute events overnight except as noted.    Interval HPI: orthostatics     PAST MEDICAL & SURGICAL HISTORY:  HOCM (hypertrophic obstructive cardiomyopathy)  HTN (hypertension)  Transverse myelitis  No significant past surgical history      REVIEW OF SYSTEMS:  as per hpi    VITALS:  Vital Signs Last 24 Hrs  T(C): 36.4 (07 Jan 2020 03:34), Max: 36.7 (06 Jan 2020 16:47)  T(F): 97.5 (07 Jan 2020 03:34), Max: 98.1 (06 Jan 2020 21:24)  HR: 66 (07 Jan 2020 09:49) (57 - 66)  BP: 131/88 (07 Jan 2020 09:49) (121/85 - 147/90)  BP(mean): --  RR: 18 (07 Jan 2020 09:49) (16 - 20)  SpO2: 97% (07 Jan 2020 09:49) (96% - 100%)  CAPILLARY BLOOD GLUCOSE      POCT Blood Glucose.: 127 mg/dL (06 Jan 2020 15:40)    I&O's Summary    06 Jan 2020 07:01  -  07 Jan 2020 07:00  --------------------------------------------------------  IN: 240 mL / OUT: 0 mL / NET: 240 mL    07 Jan 2020 07:01  -  07 Jan 2020 15:27  --------------------------------------------------------  IN: 480 mL / OUT: 0 mL / NET: 480 mL        PHYSICAL EXAM:  GENERAL: NAD, well-developed  HEAD:  Atraumatic, Normocephalic  EYES: EOMI, PERRLA, conjunctiva and sclera clear  NECK: Supple, No JVD  CHEST/LUNG: Clear to auscultation bilaterally; No wheeze  HEART: S1, S2; No murmurs, rubs, or gallops  ABDOMEN: Soft, Nontender, Nondistended; Bowel sounds present  EXTREMITIES:  2+ Peripheral Pulses, No clubbing, cyanosis, or edema  PSYCH: Normal affect  NEUROLOGY: AAOX3; non-focal  SKIN: No rashes or lesions    Consultant(s) Notes Reviewed:  [x ] YES  [ ] NO  Care Discussed with Consultants/Other Providers [ x] YES  [ ] NO    MEDS:  MEDICATIONS  (STANDING):  ATENolol  Tablet 25 milliGRAM(s) Oral daily  gabapentin 100 milliGRAM(s) Oral three times a day  influenza   Vaccine 0.5 milliLiter(s) IntraMuscular once  sodium chloride 0.9%. 1000 milliLiter(s) (100 mL/Hr) IV Continuous <Continuous>    MEDICATIONS  (PRN):    ALLERGIES:  No Known Allergies      LABS:                        16.7   7.77  )-----------( 152      ( 07 Jan 2020 11:33 )             48.7     01-07    138  |  106  |  14  ----------------------------<  106<H>  4.5   |  23  |  1.02    Ca    9.5      07 Jan 2020 11:21  Phos  3.2     01-07  Mg     2.0     01-07    TPro  6.4  /  Alb  4.4  /  TBili  1.6<H>  /  DBili  x   /  AST  21  /  ALT  22  /  AlkPhos  33<L>  01-07    PT/INR - ( 06 Jan 2020 16:10 )   PT: 16.4 sec;   INR: 1.41 ratio         PTT - ( 06 Jan 2020 16:10 )  PTT:24.5 sec      LIVER FUNCTIONS - ( 07 Jan 2020 11:21 )  Alb: 4.4 g/dL / Pro: 6.4 g/dL / ALK PHOS: 33 U/L / ALT: 22 U/L / AST: 21 U/L / GGT: x             TSH:   A1c:  BNP:  Lipid panel:   cultures:     RADIOLOGY & ADDITIONAL TESTS:    Imaging Personally Reviewed:  [ ] YES  [ ] NO

## 2020-01-07 NOTE — DISCHARGE NOTE PROVIDER - NSDCCPCAREPLAN_GEN_ALL_CORE_FT
PRINCIPAL DISCHARGE DIAGNOSIS  Diagnosis: Syncope  Assessment and Plan of Treatment: Syncope  you will be free of your symtoms  increase daily water intake      SECONDARY DISCHARGE DIAGNOSES  Diagnosis: HOCM (hypertrophic obstructive cardiomyopathy)  Assessment and Plan of Treatment: TTE EF 55%    Diagnosis: Transverse myelitis  Assessment and Plan of Treatment: Transverse myelitis  Contact [facility] regarding you wished to no longer procedure with apheresis. (Dr. Nguyen)

## 2020-01-07 NOTE — DISCHARGE NOTE NURSING/CASE MANAGEMENT/SOCIAL WORK - PATIENT PORTAL LINK FT
You can access the FollowMyHealth Patient Portal offered by [facility] by registering at the following website: [email or web address]. By joining [facility]’s FollowMyHealth portal, you will also be able to view your health information using other applications (apps) compatible with our system.

## 2020-01-07 NOTE — DISCHARGE NOTE PROVIDER - HOSPITAL COURSE
HPI:    55yo male HTN, HOCM w/ loop recorder and transverse myelitis p/w syncope after first apheresis session today. Spoke with Herbie RN from apheresis and said patient was normotensive and feeling episodically lightheaded when he ultimately syncopized while sitting. Did not witness seizure-like activity, tongue biting, incontinence or head trauma. Patient remembers feeling lightheaded then woke up in the stretcher on the way to the ED. Denies chest pain, dyspnea, new numbness/weakness, change in vision, headache.         patient seen and evaluated by neurology team for trans. myelitis- condition is stable. Right double lumen access for apheresis remains in place. Dr. Alex (cards) seen patient + orthostatics- patient hydrated and now normotensive, no longer orthostatis. TTE completed with EF 55%.        Case discussed with Dr. Monk (medical attending)- patient stable for discharge with close cardiology follow up. Patient stated he no longer wants to proceed with apheresis and would like the shiley removed. Spoke with IR NP patient given booking office number to have shiley removed.

## 2020-01-07 NOTE — PROGRESS NOTE ADULT - PROBLEM SELECTOR PLAN 1
Suspect secondary to hypovolemia and underlying HOCM   Monitor on Tele   Check thyroid panel   IV hydration   check orthostatics.

## 2020-01-07 NOTE — DISCHARGE NOTE PROVIDER - PROVIDER TOKENS
FREE:[LAST:[Wendy],FIRST:[Ze],PHONE:[(   )    -],FAX:[(   )    -],ADDRESS:[[address]  [address]]],FREE:[LAST:[Yoan],FIRST:[Lisetheh],PHONE:[(   )    -],FAX:[(   )    -]],FREE:[LAST:[Yoan],FIRST:[Citlalispeh],PHONE:[(   )    -],FAX:[(   )    -],ADDRESS:[[address] [place] [address], [address]]]

## 2020-01-07 NOTE — DISCHARGE NOTE PROVIDER - NSDCMRMEDTOKEN_GEN_ALL_CORE_FT
atenolol 25 mg oral tablet: 1 tab(s) orally once a day  Colace 100 mg oral capsule: 1 cap(s) orally , As Needed  gabapentin 100 mg oral capsule: 1 cap(s) orally 3 times a day  Probiotic Formula oral capsule: 1 cap(s) orally once a day  Tylenol Extra Strength 500 mg oral tablet: 2 tab(s) orally , As Needed  Vitamin D3 50,000 intl units (1250 mcg) oral capsule: 1 cap(s) orally once a week (Tuesday)

## 2020-01-07 NOTE — DISCHARGE NOTE PROVIDER - CARE PROVIDER_API CALL
Ze Nguyen  [address]  [address]  Phone: (   )    -  Fax: (   )    -  Follow Up Time:     Yris Milton  Phone: (   )    -  Fax: (   )    -  Follow Up Time:     Yris Milton  [address] [place], [address]  Phone: (   )    -  Fax: (   )    -  Follow Up Time:

## 2020-01-07 NOTE — PROGRESS NOTE ADULT - ASSESSMENT
55 yo male h/o htn, HOCM s/p loop recorder, transverse myelitis, a/w syncope, post apheresis session      syncope  cards consult  echo - ok to do outpt  tele no events   f/u loop recorder interrogation - read noted  orthostatics positive  cont ivf  repeat orthostatics     transverse myelitis  neuro consulted  oupt f/u    cont home meds    dvt ppx            Advanced care planning was discussed with patient and family.  Advanced care planning forms were reviewed and discussed.  Differential diagnosis and plan of care discussed with patient after the evaluation.   Pain assessed and judicious use of narcotics when appropriate was discussed.  Importance of Fall prevention discussed.  Counseling on Smoking and Alcohol cessation was offered when appropriate.  Counseling on Diet, exercise, and medication compliance was done.

## 2020-01-08 NOTE — ED PROVIDER NOTE - INTERNATIONAL TRAVEL
Smoking Cessation Group Session #4    Site: [facility]  Date:  1/7/2020  Clinical Status of Patient: Outpatient   Length of Service and Code: 60 minutes - [id number]   Number in Attendance: 5  Group Activities/Focus of Group:  completion of TCRS (Tobacco Cessation Rating Scale) reviewed strategies, habitual behavior, stress, and high risk situations. Introduced stress with addition interventions, SOLVE, relaxation with interventions, nutrition, exercise, weight gain, and the importance of rewarding oneself for accomplishments toward becoming tobacco free. Open discussion of all items with interventions.     Target symptoms:  withdrawal and medication side effects             The following were rated moderate (3) to severe (4) on TCRS:       Moderate 3: none     Severe 4:   none  Patient's Response to Intervention: The patient is tobacco free as of 1/5/2020 and is not experiencing any negative side effects from the quit at this time. The patient remains on the prescribed tobacco cessation medication regimen of 1 mg Chantix BID without any negative side effects at this time.   Progress Toward Goals and Other Mental Status Changes: The patient denies any abnormal behavioral or mental changes at this time.     Diagnosis: Z72.0  Plan: The patient will continue with group therapy sessions and medication monitoring by CTTS. Prescribed medication management will be by physician.   Return to Clinic: 1 week    Quit Date: 1/5/2020   Planned Quit Date:   
No

## 2020-01-11 DIAGNOSIS — G37.3 ACUTE TRANSVERSE MYELITIS IN DEMYELINATING DISEASE OF CENTRAL NERVOUS SYSTEM: ICD-10-CM

## 2020-01-11 DIAGNOSIS — Z45.2 ENCOUNTER FOR ADJUSTMENT AND MANAGEMENT OF VASCULAR ACCESS DEVICE: ICD-10-CM

## 2024-02-13 ENCOUNTER — NON-APPOINTMENT (OUTPATIENT)
Age: 59
End: 2024-02-13